# Patient Record
Sex: FEMALE | Employment: UNEMPLOYED | URBAN - METROPOLITAN AREA
[De-identification: names, ages, dates, MRNs, and addresses within clinical notes are randomized per-mention and may not be internally consistent; named-entity substitution may affect disease eponyms.]

---

## 2022-12-12 ENCOUNTER — HOSPITAL ENCOUNTER (OUTPATIENT)
Age: 62
Discharge: HOME | End: 2022-12-12
Payer: COMMERCIAL

## 2022-12-12 VITALS
DIASTOLIC BLOOD PRESSURE: 80 MMHG | OXYGEN SATURATION: 99 % | RESPIRATION RATE: 20 BRPM | HEART RATE: 90 BPM | SYSTOLIC BLOOD PRESSURE: 165 MMHG | TEMPERATURE: 99 F

## 2022-12-12 DIAGNOSIS — J06.9 VIRAL URI WITH COUGH: Primary | ICD-10-CM

## 2022-12-12 LAB
POCT INFLUENZA A: NEGATIVE
POCT INFLUENZA B: NEGATIVE
SARS-COV-2 RNA RESP QL NAA+PROBE: NOT DETECTED

## 2022-12-12 PROCEDURE — 87502 INFLUENZA DNA AMP PROBE: CPT | Performed by: NURSE PRACTITIONER

## 2022-12-12 PROCEDURE — 99203 OFFICE O/P NEW LOW 30 MIN: CPT | Performed by: NURSE PRACTITIONER

## 2022-12-12 PROCEDURE — U0002 COVID-19 LAB TEST NON-CDC: HCPCS | Performed by: NURSE PRACTITIONER

## 2022-12-12 RX ORDER — ROSUVASTATIN CALCIUM 5 MG/1
TABLET, COATED ORAL
COMMUNITY

## 2022-12-12 NOTE — DISCHARGE INSTRUCTIONS
Please push fluids and make sure you are staying hydrated. Salt water gargles and tea with honey may help soothe throat. Close follow-up with primary care provider is recommended. Any worsening symptoms please go to the ER.

## 2024-05-11 ENCOUNTER — HOSPITAL ENCOUNTER (OUTPATIENT)
Age: 64
Discharge: HOME | End: 2024-05-11

## 2024-05-11 ENCOUNTER — APPOINTMENT (OUTPATIENT)
Dept: GENERAL RADIOLOGY | Age: 64
End: 2024-05-11
Attending: PHYSICIAN ASSISTANT

## 2024-05-11 VITALS
TEMPERATURE: 97 F | HEART RATE: 77 BPM | OXYGEN SATURATION: 100 % | DIASTOLIC BLOOD PRESSURE: 90 MMHG | SYSTOLIC BLOOD PRESSURE: 131 MMHG | RESPIRATION RATE: 20 BRPM

## 2024-05-11 DIAGNOSIS — R05.1 ACUTE COUGH: Primary | ICD-10-CM

## 2024-05-11 PROCEDURE — 71046 X-RAY EXAM CHEST 2 VIEWS: CPT | Performed by: PHYSICIAN ASSISTANT

## 2024-05-11 PROCEDURE — 99213 OFFICE O/P EST LOW 20 MIN: CPT | Performed by: PHYSICIAN ASSISTANT

## 2024-05-11 RX ORDER — OMEPRAZOLE 20 MG/1
20 CAPSULE, DELAYED RELEASE ORAL
COMMUNITY

## 2024-05-11 RX ORDER — LEVOTHYROXINE SODIUM 25 UG/1
TABLET ORAL
COMMUNITY
Start: 2024-05-02

## 2024-05-11 RX ORDER — BENZONATATE 100 MG/1
100 CAPSULE ORAL 3 TIMES DAILY PRN
Qty: 30 CAPSULE | Refills: 0 | Status: SHIPPED | OUTPATIENT
Start: 2024-05-11 | End: 2024-06-10

## 2024-05-11 NOTE — ED INITIAL ASSESSMENT (HPI)
Pt had endoscopy last week for \"stomach issues\" and lost her voice - saw GI for fu this past Monday.  On Tuesday began coughing and by every afternoon is \"exhausted\".  Pt states she is not sleeping either bc of coughing.  Pt takes thyroid medication from endocrine and gi prescribed Prilosec bid pt is also concerned about interaction with those medications.  Denies pain or temp.

## 2024-05-11 NOTE — ED PROVIDER NOTES
Patient Seen in: [facility]      History     Chief Complaint   Patient presents with    Cough/URI     Stated Complaint: Cough    Subjective:   HPI    Pt with pmh hypothyroidism, IBS with cough x several days.   Cough flares up when laying flat. Difficulty sleeping due to the cough.   +mild Nasal congestion  Negative home covid test. Taking delsym with mild relief.   Pt EGD 1.5 weeks ago. Sore throat the day after, then lost voice, now resolved.   Dx of gastritis. Rx Prilosec BID.   No fevers, difficulty swallowing/breathing, chest pain, abdominal pain, blood in the stool.     Objective:   Past Medical History:    Irritable bowel syndrome    Other premature beats    Unspecified hypothyroidism              Past Surgical History:   Procedure Laterality Date    Appendectomy      Special service or report      CYROSURGERY  DYSPLASIA    Special service or report      SCLEROTHERAPY                Social History     Socioeconomic History    Marital status:    Tobacco Use    Smoking status: Former     Current packs/day: 0.00     Types: Cigarettes     Quit date:      Years since quittin.3    Smokeless tobacco: Never   Vaping Use    Vaping status: Never Used   Substance and Sexual Activity    Alcohol use: Yes     Comment: occ    Drug use: Never     Social Determinants of Health      Received from [facility]    Social Connections    Received from [facility]    Housing Stability              Review of Systems    Positive for stated complaint: Cough  Other systems are as noted in HPI.  Constitutional and vital signs reviewed.      All other systems reviewed and negative except as noted above.    Physical Exam     ED Triage Vitals [24 0826]   /90   Pulse 77   Resp 20   Temp 97.4 °F (36.3 °C)   Temp src Oral   SpO2 100 %   O2 Device None (Room air)       Current Vitals:   Vital Signs  BP: 131/90  Pulse: 77  Resp: 20  Temp: 97.4 °F (36.3 °C)  Temp  src: Oral    Oxygen Therapy  SpO2: 100 %  O2 Device: None (Room air)            Physical Exam  Vitals and nursing note reviewed.   Constitutional:       General: She is not in acute distress.     Appearance: Normal appearance. She is not ill-appearing or toxic-appearing.   HENT:      Head: Normocephalic.      Ears:      Comments: Small serous effusion bilaterally       Nose: Nose normal.      Mouth/Throat:      Mouth: Mucous membranes are moist.      Comments: PND   Eyes:      Conjunctiva/sclera: Conjunctivae normal.   Cardiovascular:      Rate and Rhythm: Normal rate and regular rhythm.   Pulmonary:      Effort: Pulmonary effort is normal. No respiratory distress.      Breath sounds: Normal breath sounds.   Musculoskeletal:         General: Normal range of motion.      Cervical back: Normal range of motion.   Skin:     General: Skin is warm.   Neurological:      General: No focal deficit present.      Mental Status: She is alert.   Psychiatric:         Mood and Affect: Mood normal.         Behavior: Behavior normal.             ED Course   Labs Reviewed - No data to display                 MDM                                      Medical Decision Making  64-year-old female presents with cough for several days, most pronounced at night laying flat.   Differential viral infection versus pneumonia versus gastritis  Vital signs stable. CXR reviewed by me - no acute process.   Suspect viral URI versus gastritis.  Advised to continue supportive care for both.  Benzonatate sent over for additional supportive care.  Advised to continue sx measures. Close PCP follow-up.  ER precautions advised    Disposition and Plan     Clinical Impression:  1. Acute cough         Disposition:  Discharge  5/11/2024  9:20 am    Follow-up:  Dee Alcantara DO  [address]  [facility] [address]  [phone]          [facility] [address] [address]  [phone]              Medications  Prescribed:  Current Discharge Medication List        START taking these medications    Details   benzonatate 100 MG Oral Cap Take 1 capsule (100 mg total) by mouth 3 (three) times daily as needed for cough.  Qty: 30 capsule, Refills: 0